# Patient Record
Sex: MALE | Race: WHITE | Employment: FULL TIME | URBAN - METROPOLITAN AREA
[De-identification: names, ages, dates, MRNs, and addresses within clinical notes are randomized per-mention and may not be internally consistent; named-entity substitution may affect disease eponyms.]

---

## 2023-04-05 NOTE — PROGRESS NOTES
ASSESSMENT/PLAN:  Below is the assessment and plan developed based on review of pertinent history, physical exam, labs, studies, and medications. {There are no diagnoses linked to this encounter. (Refresh or delete this SmartLink)}    No follow-ups on file. In discussion with the patient, we considered the numerus possible diagnoses that could be contributing to their present symptoms. We also deliberated on the extensive management options that must be considered to treat their current condition. We reviewed their accessible prior medical records, diagnostic tests, and current health and employment information. We considered how these symptoms were affecting the patient´s activities of daily living as well as employment and fitness activities. The patient had various questions regarding the possible risks, benefits, complications, morbidity and mortality regarding their diagnosis and treatment options. The patients´ comorbidities were considered, and I advocated that they consider maximizing lifestyle modification through nutrition and exercise to aid in addressing their symptoms. Shared decision making yielded an understanding to move forward with conservation treatment preferences. The patient expressed understanding that if conservative management fails to alleviate the present symptoms they will return to office for re-evaluation and consideration of additional diagnostic tests and potential surgical options. In the interim, we have recommended ice, elevation, and take prescription anti-inflammatory medications along with a physician directed home exercise program. We discussed the risks and common side effects of anti-inflammatory medications and instructed the patient to discontinue the medication and contact us if they experienced any side effects.  The patient was encouraged to discuss the possible side effects with their family physician or pharmacist prior to initiating any new medications. We discussed the fact that many of the recommended treatment options presented are significantly limited by the patient´s social determinants of health. We also reviewed the circumstances surrounding the environment that they live and work which affect a wide range of health risk. We considered the limited access to appropriate educational resources regarding proper nutrition and exercise as well as the economic and social support necessary to maintain health and wellbeing. ***    SUBJECTIVE/OBJECTIVE:  Henry Glover (: 1980) is a 36 y.o. male, patient,here for evaluation of the No chief complaint on file. .   ***     PHYSICAL EXAM:    ***    IMAGING:    I have independently reviewed and interpreted the following images:     ***    Not on File    No current outpatient medications on file. No current facility-administered medications for this visit. No past medical history on file. No past surgical history on file. No family history on file. Social History     Socioeconomic History    Marital status:      Spouse name: Not on file    Number of children: Not on file    Years of education: Not on file    Highest education level: Not on file   Occupational History    Not on file   Tobacco Use    Smoking status: Not on file    Smokeless tobacco: Not on file   Substance and Sexual Activity    Alcohol use: Not on file    Drug use: Not on file    Sexual activity: Not on file   Other Topics Concern    Not on file   Social History Narrative    Not on file     Social Determinants of Health     Financial Resource Strain: Not on file   Food Insecurity: Not on file   Transportation Needs: Not on file   Physical Activity: Not on file   Stress: Not on file   Social Connections: Not on file   Intimate Partner Violence: Not on file   Housing Stability: Not on file       Review of Systems    No flowsheet data found. Vitals: There were no vitals taken for this visit.    There is no height or weight on file to calculate BMI. An electronic signature was used to authenticate this note.   -- Ruyb Bledsoe MD

## 2023-04-07 ENCOUNTER — OFFICE VISIT (OUTPATIENT)
Dept: ORTHOPEDIC SURGERY | Age: 43
End: 2023-04-07
Payer: COMMERCIAL

## 2023-04-07 PROCEDURE — 99204 OFFICE O/P NEW MOD 45 MIN: CPT | Performed by: ORTHOPAEDIC SURGERY

## 2023-04-18 NOTE — PROGRESS NOTES
ASSESSMENT/PLAN:  Below is the assessment and plan developed based on review of pertinent history, physical exam, labs, studies, and medications. 1. Osteoarthritis of right shoulder, unspecified osteoarthritis type  2. Nontraumatic tear of right rotator cuff, unspecified tear extent      Return in about 6 weeks (around 5/31/2023). In discussion with the patient, we considered the numerus possible diagnoses that could be contributing to their present symptoms. We also deliberated on the extensive management options that must be considered to treat their current condition. We reviewed their accessible prior medical records, diagnostic tests, and current health and employment information. We considered how these symptoms were affecting the patient´s activities of daily living as well as employment and fitness activities. The patient had various questions regarding the possible risks, benefits, complications, morbidity and mortality regarding their diagnosis and treatment options. The patients´ comorbidities were considered, and I advocated that they consider maximizing lifestyle modification through nutrition and exercise to aid in addressing their symptoms. Shared decision making yielded an understanding to move forward with conservation treatment preferences. The patient expressed understanding that if conservative management fails to alleviate the present symptoms they will return to office for re-evaluation and consideration of additional diagnostic tests and potential surgical options. In the interim, we have recommended ice, elevation, and take prescription anti-inflammatory medications along with a physician directed home exercise program. We discussed the risks and common side effects of anti-inflammatory medications and instructed the patient to discontinue the medication and contact us if they experienced any side effects.  The patient was encouraged to discuss the possible side effects with their family physician or pharmacist prior to initiating any new medications. We discussed the fact that many of the recommended treatment options presented are significantly limited by the patient´s social determinants of health. We also reviewed the circumstances surrounding the environment that they live and work which affect a wide range of health risk. We considered the limited access to appropriate educational resources regarding proper nutrition and exercise as well as the economic and social support necessary to maintain health and wellbeing. Given that the patient's symptoms are increasing in frequency and duration we have decided to prescribe physical therapy. We talked about the fact that the goal of physical therapy is for the therapist to assist in developing a program to help return the patient to full strength, function and mobility and decrease pain. We also discussed that the therapist may combine several techniques to help decrease pain. These include but are not limited to stretching, balance exercises, strength training, massage, cold and heat therapy, and electrical stimulation. Although, physical therapy is generally safe, we went over the potential risks to include the worsening of pre-existing conditions, continued pain and no improvement in flexibility, mobility, and strength. We will have the patient follow up after physical therapy to closely monitor their progress. We talked about following up sooner if therapy is not progressing on a weekly basis. We talked about the fact that he did have some early arthritic change in his shoulder as well as some undersurface tearing of the rotator cuff. We will focus on physical therapy prior to reinstituting volleyball. We will see him back in 6 weeks time to evaluate his progress. SUBJECTIVE/OBJECTIVE:  Marcella Bumpers (: 1980) is a 36 y.o. male, patient,here for evaluation of the Shoulder Pain (right)  .    Patient presents with chief complaint of right shoulder pain. States over the past month he has developed increased amounts of shoulder pain. Patient is a  and hurts while he is playing and afterwards. He has tried massage, ice, anti-inflammatory medications, and periods of rest without any significant improvement. Anytime he plays volleyball he gets worse. Is progressed to the point now where he cannot swing with his right arm because of the pain and weakness. He has trouble with overhead type activities. Lifting makes the pain worse. Rest makes it minimally better. He denies any specific numbness and tingling that extends down the arm. PHYSICAL EXAM:    Upon physical examination, the patient is well developed, well nourished, alert and oriented times three, with normal mood and affect and walks with a normal gait. Upon examination of the right shoulder, the patient sits with the scapula protracted and depressed. They are tender to palpation over the anterior supraspinatus and proximal biceps. There is mild palpable crepitus in the subacromial space with ranging. The patient has limited range of motion, a painful arc test and discomfort with above shoulder range of motion. The patient has discomfort with Neer and Gomez impingement maneuvers and Whipple testing. The shoulder is stable on exam. They have 5/5 strength with internal rotation, but are significantly weak with external rotation and supraspinatus isolation testing, and are neurovascularly intact distally. There is no erythema, warmth or skin lesions present. On examination of the contralateral extremity, the patient is nontender to palpation and has excellent range of motion, stability and strength.     IMAGING:    I have independently reviewed and interpreted the following images:     MRI SHOULDER RT WO CONT  Narrative: EXAM: MRI SHOULDER RT WO CONT    INDICATION: Right shoulder pain    COMPARISON: 4/7/2023    TECHNIQUE: Axial proton density fat-saturated; oblique coronal T1, T2  fat-saturated, and proton density fat-saturated; and oblique sagittal T2  fat-saturated MRI of the right shoulder . CONTRAST: None. FINDINGS: A.C. joint: Mild degenerative change Anterior acromion process type: 2  with anterior downsloping of the acromion    Bone marrow: Within normal limits. No acute fracture, dislocation, or marrow  replacing process. Joint fluid: Small glenohumeral effusion     Rotator cuff tendons: Small high-grade partial-thickness undersurface tear of  the far anterior supraspinatus. More diffuse shallow partial-thickness  undersurface tearing extending posteriorly. Remaining cuff tendons are intact     Biceps tendon: Intact and located within the bicipital groove. Muscles: No edema or atrophy. Glenoid labrum: There is near circumferential tearing of the labrum. Portions of  the posterior posterior inferior labrum remain intact     Glenohumeral joint capsule: within normal limits. Glenohumeral articular cartilage: There are diffuse areas of cartilage loss  within the superior central and inferior medial humeral head additional thinning  of the glenoid cartilage as well    Soft tissue mass: None. Impression: 1. Small high-grade partial-thickness undersurface tear anterior supraspinatus  with more diffuse shallow partial-thickness tearing extending posteriorly  2. Moderate glenohumeral cartilage loss  3. Near circumferential tear of the labrum        No Known Allergies    No current outpatient medications on file. No current facility-administered medications for this visit. No past medical history on file. No past surgical history on file. No family history on file.     Social History     Socioeconomic History    Marital status:      Spouse name: Not on file    Number of children: Not on file    Years of education: Not on file    Highest education level: Not on file   Occupational History    Not on file   Tobacco Use    Smoking status: Never    Smokeless tobacco: Never   Vaping Use    Vaping Use: Never used   Substance and Sexual Activity    Alcohol use: Yes    Drug use: Never    Sexual activity: Not on file   Other Topics Concern    Not on file   Social History Narrative    Not on file     Social Determinants of Health     Financial Resource Strain: Not on file   Food Insecurity: Not on file   Transportation Needs: Not on file   Physical Activity: Not on file   Stress: Not on file   Social Connections: Not on file   Intimate Partner Violence: Not on file   Housing Stability: Not on file       Review of Systems    No flowsheet data found. Vitals:  Ht 6' 4\" (1.93 m)   Wt 250 lb (113.4 kg)   BMI 30.43 kg/m²    Body mass index is 30.43 kg/m². An electronic signature was used to authenticate this note.   -- Trae Monge MD

## 2023-04-19 ENCOUNTER — OFFICE VISIT (OUTPATIENT)
Dept: ORTHOPEDIC SURGERY | Age: 43
End: 2023-04-19
Payer: COMMERCIAL

## 2023-04-19 VITALS — BODY MASS INDEX: 30.44 KG/M2 | WEIGHT: 250 LBS | HEIGHT: 76 IN

## 2023-04-19 DIAGNOSIS — M19.011 OSTEOARTHRITIS OF RIGHT SHOULDER, UNSPECIFIED OSTEOARTHRITIS TYPE: Primary | ICD-10-CM

## 2023-04-19 DIAGNOSIS — M75.101 NONTRAUMATIC TEAR OF RIGHT ROTATOR CUFF, UNSPECIFIED TEAR EXTENT: ICD-10-CM

## 2023-04-19 PROCEDURE — 99214 OFFICE O/P EST MOD 30 MIN: CPT | Performed by: ORTHOPAEDIC SURGERY
